# Patient Record
Sex: FEMALE | URBAN - METROPOLITAN AREA
[De-identification: names, ages, dates, MRNs, and addresses within clinical notes are randomized per-mention and may not be internally consistent; named-entity substitution may affect disease eponyms.]

---

## 2024-10-08 ENCOUNTER — TELEPHONE (OUTPATIENT)
Age: 34
End: 2024-10-08

## 2024-10-08 NOTE — TELEPHONE ENCOUNTER
Insurance:     [employer] [facility] ppo    ID#      [id number]    Subscriber is pts  João Najera      Pt will call [employer] to ensure that we are in network.

## 2024-10-22 ENCOUNTER — TELEPHONE (OUTPATIENT)
Dept: NEUROLOGY | Facility: CLINIC | Age: 34
End: 2024-10-22

## 2024-10-22 NOTE — TELEPHONE ENCOUNTER
Called [facility] asking for images from Brain MRI done in 4/2024 for upcoming appt. Spoke to Katelin who transferred me to imaging department who said they would have it sent to [facility].

## 2025-01-29 ENCOUNTER — TELEPHONE (OUTPATIENT)
Age: 35
End: 2025-01-29

## 2025-01-29 NOTE — TELEPHONE ENCOUNTER
Patients , João, called to schedule consultation for patient. I advised him patient would need a referral to schedule and I provided central fax number. Thank you.